# Patient Record
Sex: MALE | Race: WHITE | HISPANIC OR LATINO | Employment: FULL TIME | URBAN - METROPOLITAN AREA
[De-identification: names, ages, dates, MRNs, and addresses within clinical notes are randomized per-mention and may not be internally consistent; named-entity substitution may affect disease eponyms.]

---

## 2024-08-05 ENCOUNTER — APPOINTMENT (OUTPATIENT)
Dept: RADIOLOGY | Facility: MEDICAL CENTER | Age: 29
End: 2024-08-05
Attending: EMERGENCY MEDICINE

## 2024-08-05 ENCOUNTER — HOSPITAL ENCOUNTER (EMERGENCY)
Facility: MEDICAL CENTER | Age: 29
End: 2024-08-05
Attending: EMERGENCY MEDICINE

## 2024-08-05 VITALS
RESPIRATION RATE: 18 BRPM | WEIGHT: 229.5 LBS | HEART RATE: 81 BPM | SYSTOLIC BLOOD PRESSURE: 128 MMHG | OXYGEN SATURATION: 96 % | TEMPERATURE: 98 F | BODY MASS INDEX: 27.1 KG/M2 | HEIGHT: 77 IN | DIASTOLIC BLOOD PRESSURE: 82 MMHG

## 2024-08-05 DIAGNOSIS — J18.9 PNEUMONIA OF BOTH LOWER LOBES DUE TO INFECTIOUS ORGANISM: ICD-10-CM

## 2024-08-05 DIAGNOSIS — R07.81 PLEURITIC CHEST PAIN: ICD-10-CM

## 2024-08-05 LAB
ALBUMIN SERPL BCP-MCNC: 4 G/DL (ref 3.2–4.9)
ALBUMIN/GLOB SERPL: 1.1 G/DL
ALP SERPL-CCNC: 97 U/L (ref 30–99)
ALT SERPL-CCNC: 20 U/L (ref 2–50)
ANION GAP SERPL CALC-SCNC: 12 MMOL/L (ref 7–16)
AST SERPL-CCNC: 19 U/L (ref 12–45)
BASOPHILS # BLD AUTO: 0.7 % (ref 0–1.8)
BASOPHILS # BLD: 0.06 K/UL (ref 0–0.12)
BILIRUB SERPL-MCNC: 0.5 MG/DL (ref 0.1–1.5)
BUN SERPL-MCNC: 8 MG/DL (ref 8–22)
CALCIUM ALBUM COR SERPL-MCNC: 9.6 MG/DL (ref 8.5–10.5)
CALCIUM SERPL-MCNC: 9.6 MG/DL (ref 8.5–10.5)
CHLORIDE SERPL-SCNC: 104 MMOL/L (ref 96–112)
CO2 SERPL-SCNC: 22 MMOL/L (ref 20–33)
CREAT SERPL-MCNC: 0.6 MG/DL (ref 0.5–1.4)
EKG IMPRESSION: NORMAL
EOSINOPHIL # BLD AUTO: 0.2 K/UL (ref 0–0.51)
EOSINOPHIL NFR BLD: 2.4 % (ref 0–6.9)
ERYTHROCYTE [DISTWIDTH] IN BLOOD BY AUTOMATED COUNT: 41.2 FL (ref 35.9–50)
FLUAV RNA SPEC QL NAA+PROBE: NEGATIVE
FLUBV RNA SPEC QL NAA+PROBE: NEGATIVE
GFR SERPLBLD CREATININE-BSD FMLA CKD-EPI: 133 ML/MIN/1.73 M 2
GLOBULIN SER CALC-MCNC: 3.6 G/DL (ref 1.9–3.5)
GLUCOSE SERPL-MCNC: 100 MG/DL (ref 65–99)
HCT VFR BLD AUTO: 53.7 % (ref 42–52)
HGB BLD-MCNC: 18.3 G/DL (ref 14–18)
HIV 1+2 AB+HIV1 P24 AG SERPL QL IA: NORMAL
IMM GRANULOCYTES # BLD AUTO: 0.11 K/UL (ref 0–0.11)
IMM GRANULOCYTES NFR BLD AUTO: 1.3 % (ref 0–0.9)
LIPASE SERPL-CCNC: 22 U/L (ref 11–82)
LYMPHOCYTES # BLD AUTO: 0.86 K/UL (ref 1–4.8)
LYMPHOCYTES NFR BLD: 10.4 % (ref 22–41)
MCH RBC QN AUTO: 28.6 PG (ref 27–33)
MCHC RBC AUTO-ENTMCNC: 34.1 G/DL (ref 32.3–36.5)
MCV RBC AUTO: 83.9 FL (ref 81.4–97.8)
MONOCYTES # BLD AUTO: 0.69 K/UL (ref 0–0.85)
MONOCYTES NFR BLD AUTO: 8.3 % (ref 0–13.4)
NEUTROPHILS # BLD AUTO: 6.38 K/UL (ref 1.82–7.42)
NEUTROPHILS NFR BLD: 76.9 % (ref 44–72)
NRBC # BLD AUTO: 0 K/UL
NRBC BLD-RTO: 0 /100 WBC (ref 0–0.2)
NT-PROBNP SERPL IA-MCNC: <36 PG/ML (ref 0–125)
PLATELET # BLD AUTO: 319 K/UL (ref 164–446)
PMV BLD AUTO: 11.4 FL (ref 9–12.9)
POTASSIUM SERPL-SCNC: 4 MMOL/L (ref 3.6–5.5)
PROT SERPL-MCNC: 7.6 G/DL (ref 6–8.2)
RBC # BLD AUTO: 6.4 M/UL (ref 4.7–6.1)
RSV RNA SPEC QL NAA+PROBE: NEGATIVE
SARS-COV-2 RNA RESP QL NAA+PROBE: NOTDETECTED
SODIUM SERPL-SCNC: 138 MMOL/L (ref 135–145)
TROPONIN T SERPL-MCNC: <6 NG/L (ref 6–19)
WBC # BLD AUTO: 8.3 K/UL (ref 4.8–10.8)

## 2024-08-05 PROCEDURE — 71045 X-RAY EXAM CHEST 1 VIEW: CPT

## 2024-08-05 PROCEDURE — 700102 HCHG RX REV CODE 250 W/ 637 OVERRIDE(OP): Performed by: EMERGENCY MEDICINE

## 2024-08-05 PROCEDURE — 99284 EMERGENCY DEPT VISIT MOD MDM: CPT

## 2024-08-05 PROCEDURE — 93005 ELECTROCARDIOGRAM TRACING: CPT

## 2024-08-05 PROCEDURE — 700117 HCHG RX CONTRAST REV CODE 255: Performed by: EMERGENCY MEDICINE

## 2024-08-05 PROCEDURE — A9270 NON-COVERED ITEM OR SERVICE: HCPCS | Performed by: EMERGENCY MEDICINE

## 2024-08-05 PROCEDURE — 0241U HCHG SARS-COV-2 COVID-19 NFCT DS RESP RNA 4 TRGT ED POC: CPT

## 2024-08-05 PROCEDURE — 36415 COLL VENOUS BLD VENIPUNCTURE: CPT

## 2024-08-05 PROCEDURE — 84484 ASSAY OF TROPONIN QUANT: CPT

## 2024-08-05 PROCEDURE — 83880 ASSAY OF NATRIURETIC PEPTIDE: CPT

## 2024-08-05 PROCEDURE — 83690 ASSAY OF LIPASE: CPT

## 2024-08-05 PROCEDURE — 80053 COMPREHEN METABOLIC PANEL: CPT

## 2024-08-05 PROCEDURE — 87389 HIV-1 AG W/HIV-1&-2 AB AG IA: CPT

## 2024-08-05 PROCEDURE — 93005 ELECTROCARDIOGRAM TRACING: CPT | Performed by: EMERGENCY MEDICINE

## 2024-08-05 PROCEDURE — 85025 COMPLETE CBC W/AUTO DIFF WBC: CPT

## 2024-08-05 PROCEDURE — 71275 CT ANGIOGRAPHY CHEST: CPT

## 2024-08-05 RX ORDER — AZITHROMYCIN 250 MG/1
TABLET, FILM COATED ORAL
Qty: 6 TABLET | Refills: 0 | Status: ACTIVE | OUTPATIENT
Start: 2024-08-05

## 2024-08-05 RX ADMIN — IOHEXOL 55 ML: 350 INJECTION, SOLUTION INTRAVENOUS at 15:41

## 2024-08-05 RX ADMIN — AMOXICILLIN AND CLAVULANATE POTASSIUM 1 TABLET: 875; 125 TABLET, FILM COATED ORAL at 16:37

## 2024-08-05 RX ADMIN — LIDOCAINE HYDROCHLORIDE 30 ML: 20 SOLUTION OROPHARYNGEAL at 13:14

## 2024-08-05 NOTE — ED PROVIDER NOTES
"ER Provider Note    Scribed for Dr. Pawan Chung D.O. by Brendan Morris. 8/5/2024  12:31 PM    Primary Care Provider: No primary care provider on file.    CHIEF COMPLAINT  Chief Complaint   Patient presents with    Chest Pain     Reports substernal chest tightness accompanied by lightheadedness and dizziness this AM.      HPI/ROS  LIMITATION TO HISTORY   Language: Slovak, Family member translated    OUTSIDE HISTORIAN(S):  Family member translated entirety of history    Joao Martinez is a 29 y.o. male who presents to the Emergency Department for evaluation of chest tightness onset 5 hours ago. The patient reports that he was kneeling down at work, and when he went to stand up he felt a pain and tightness in the center of his chest with associated dizziness, headache, blurred vision, sweating, shortness of breath, and a lightheadedness that made him feel near syncopal. Denies nausea. He notes that the other symptoms have gone away, but he still has a tightness in his chest. Patient denies a history of heart or lung issues, and does not drink daily.    ROS as per HPI.    PAST MEDICAL HISTORY  No past heart or lung issues.    SURGICAL HISTORY  No past surgical history noted.    FAMILY HISTORY  Mother has diabetes    SOCIAL HISTORY   Denies drinking daily. States he has about 3 beers during the weekends.    CURRENT MEDICATIONS  Previous Medications    No current medications noted.     ALLERGIES  Patient has no known allergies.    PHYSICAL EXAM  BP (!) 146/89   Pulse 76   Temp 36.6 °C (97.8 °F) (Temporal)   Resp 16   Ht 1.956 m (6' 5\")   Wt 104 kg (229 lb 8 oz)   SpO2 97%   BMI 27.21 kg/m²     General: No acute distress.  HENT: Normocephalic, Mucus membranes are moist.   Chest: Lungs have even and unlabored respirations, Clear to auscultation.   Cardiovascular: Regular rate and regular rhythm, No peripheral cyanosis.  Abdomen: Non distended. Mild mid epigastric tenderness  Neuro: Awake, Conversive, " Able to relay recent events.  Psychiatric: Calm and cooperative.     INITIAL ASSESSMENT  Patient had an episode of chest pain and shortness of breath that happened when he was kneeling and was associated with orthostatic changes and lightheadedness. On exam, he has some pain that is reproducible with palpation of the epigastric area. Will evaluate for cardiac injury and pancreatitis along with treatment for pain.    ED Observation Status? No; Patient does not meet criteria for ED Observation.     DIAGNOSTIC STUDIES    Labs:   Results for orders placed or performed during the hospital encounter of 08/05/24   CBC WITH DIFFERENTIAL   Result Value Ref Range    WBC 8.3 4.8 - 10.8 K/uL    RBC 6.40 (H) 4.70 - 6.10 M/uL    Hemoglobin 18.3 (H) 14.0 - 18.0 g/dL    Hematocrit 53.7 (H) 42.0 - 52.0 %    MCV 83.9 81.4 - 97.8 fL    MCH 28.6 27.0 - 33.0 pg    MCHC 34.1 32.3 - 36.5 g/dL    RDW 41.2 35.9 - 50.0 fL    Platelet Count 319 164 - 446 K/uL    MPV 11.4 9.0 - 12.9 fL    Neutrophils-Polys 76.90 (H) 44.00 - 72.00 %    Lymphocytes 10.40 (L) 22.00 - 41.00 %    Monocytes 8.30 0.00 - 13.40 %    Eosinophils 2.40 0.00 - 6.90 %    Basophils 0.70 0.00 - 1.80 %    Immature Granulocytes 1.30 (H) 0.00 - 0.90 %    Nucleated RBC 0.00 0.00 - 0.20 /100 WBC    Neutrophils (Absolute) 6.38 1.82 - 7.42 K/uL    Lymphs (Absolute) 0.86 (L) 1.00 - 4.80 K/uL    Monos (Absolute) 0.69 0.00 - 0.85 K/uL    Eos (Absolute) 0.20 0.00 - 0.51 K/uL    Baso (Absolute) 0.06 0.00 - 0.12 K/uL    Immature Granulocytes (abs) 0.11 0.00 - 0.11 K/uL    NRBC (Absolute) 0.00 K/uL   COMP METABOLIC PANEL   Result Value Ref Range    Sodium 138 135 - 145 mmol/L    Potassium 4.0 3.6 - 5.5 mmol/L    Chloride 104 96 - 112 mmol/L    Co2 22 20 - 33 mmol/L    Anion Gap 12.0 7.0 - 16.0    Glucose 100 (H) 65 - 99 mg/dL    Bun 8 8 - 22 mg/dL    Creatinine 0.60 0.50 - 1.40 mg/dL    Calcium 9.6 8.5 - 10.5 mg/dL    Correct Calcium 9.6 8.5 - 10.5 mg/dL    AST(SGOT) 19 12 - 45 U/L     ALT(SGPT) 20 2 - 50 U/L    Alkaline Phosphatase 97 30 - 99 U/L    Total Bilirubin 0.5 0.1 - 1.5 mg/dL    Albumin 4.0 3.2 - 4.9 g/dL    Total Protein 7.6 6.0 - 8.2 g/dL    Globulin 3.6 (H) 1.9 - 3.5 g/dL    A-G Ratio 1.1 g/dL   TROPONIN   Result Value Ref Range    Troponin T <6 6 - 19 ng/L   proBrain Natriuretic Peptide, NT   Result Value Ref Range    NT-proBNP <36 0 - 125 pg/mL   LIPASE   Result Value Ref Range    Lipase 22 11 - 82 U/L   ESTIMATED GFR   Result Value Ref Range    GFR (CKD-EPI) 133 >60 mL/min/1.73 m 2   EKG   Result Value Ref Range    Report       [facility] Emergency Dept.    Test Date:  2024  Pt Name:    TIMMY JORGENSEN         Department: ER  MRN:        [id number]                      Room:  Gender:     Male                         Technician: [id number]  :        1995                   Requested By:ER TRIAGE PROTOCOL  Order #:    [id number]                    Reading MD: MAYRA HALL D.O.    Measurements  Intervals                                Axis  Rate:       73                           P:          17  MD:         150                          QRS:        223  QRSD:       108                          T:          49  QT:         383  QTc:        422    Interpretive Statements  Sinus rhythm  Markedly posterior QRS axis  No previous ECG available for comparison  Electronically Signed On 2024 16:32:24 PDT by MAYRA HALL D.O.     POC CoV-2, FLU A/B, RSV by PCR   Result Value Ref Range    POC Influenza A RNA, PCR Negative Negative    POC Influenza B RNA, PCR Negative Negative    POC RSV, by PCR Negative Negative    POC SARS-CoV-2, PCR NotDetected NotDetected     EKG:   I have independently interpreted the above EKG.    Radiology:   The attending emergency physician has independently interpreted the diagnostic imaging associated with this visit and am waiting the final reading from the radiologist.   Preliminary interpretation is as follows: Chest  x-ray interstitial disease versus pneumonia  Radiologist interpretation:   CT-CTA CHEST PULMONARY ARTERY W/ RECONS   Final Result      Extensive bilateral predominantly perihilar groundglass opacities with bronchial wall thickening and extensive mediastinal and hilar adenopathy. Differential considerations would include infectious etiology however neoplastic etiology such as mucosa    associated lymphoid tissue lymphoma (MALT) or other neoplastic etiology cannot be excluded.            DX-CHEST-PORTABLE (1 VIEW)   Final Result      Moderate perihilar interstitial pulmonary edema.        COURSE & MEDICAL DECISION MAKING     COURSE AND PLAN  12:31 PM - Patient seen and examined at bedside. Discussed plan of care, including obtaining lab work and imaging for further evaluation, along with treating his pain with medication. Patient agrees to the plan of care. The patient will be medicated with GI Cocktail 30 mL. Ordered for DX-Chest-Portable, EKG, CBC w/ diff, CMP, and Troponin to evaluate his symptoms.     1:08 PM - Ordered for CT-CTA Chest Pulmonary Artery w/ recons, Estimated GFR, proBrain Natriuretic Peptide, and HIV AG/AB Combo Assay Screening.     4:14 PM - Paged Dr. Tran (Pulmonology).    4:43 PM - I discussed the patient's case and the above findings with Dr. Tran (Pulmonology) who recommends antibiotics treatment and follow-up with pulmonology in 2 weeks    5:01 PM - I reevaluated the patient at bedside. I discussed the patient's diagnostic study results which show likely pneumonia of both lower lobes. I discussed plan for discharge and follow up as outlined below. The patient is stable for discharge at this time and will return for any new or worsening symptoms. Patient verbalizes understanding and support with my plan for discharge.     ED Summary: This patient presented with an episode of chest pain and shortness of breath with lightheadedness.  On his chest x-ray was read as pulmonary edema, this is  unusual for someone his age.  His BNP is normal, COVID is normal I do not know the cause for this abnormal chest x-ray so CT was done CT shows infectious process versus carcinomatous I spoke with the pulmonologist on-call and the patient be treated with antibiotics discharged home with follow-up in 2 weeks with pulmonology      DISPOSITION AND DISCUSSIONS  I have discussed management of the patient with the following physicians and [facility]'s: Dr. Tran (Pulmonology)    Discussion of management with other [facility] or appropriate source(s): None    Barriers to care at this time, including but not limited to: Patient does not have a PCP.     The patient will return for new or worsening symptoms and is stable at the time of discharge.    The patient is referred to a primary physician for blood pressure management, diabetic screening, and for all other preventative health concerns.    DISPOSITION:  Patient will be discharged home in stable condition.    FOLLOW UP:  [facility] - PULMONARY MEDICINE  [address]  [facility] [address]  [phone]  Follow up in 2 week(s)      OUTPATIENT MEDICATIONS:  Discharge Medication List as of 8/5/2024  4:41 PM        START taking these medications    Details   amoxicillin-clavulanate (AUGMENTIN) 875-125 MG Tab Take 1 Tablet by mouth 2 times a day., Disp-20 Tablet, R-0, Normal      azithromycin (ZITHROMAX) 250 MG Tab Take two tabs by mouth on day one, then one tab by mouth daily on days 2-5., Disp-6 Tablet, R-0, Normal           FINAL DIAGNOSIS  1. Pneumonia of both lower lobes due to infectious organism    2. Pleuritic chest pain      IBrendan (Jagruti), am scribing for, and in the presence of, Dr. Pawan Chung D.O.    Electronically signed by: Brendan Morris (Jagruti), 8/5/2024    IDr. Pawan D.O. personally performed the services described in this documentation, as scribed by Brendan Morris in my presence, and it is both accurate and complete.     The  note accurately reflects work and decisions made by me.  Pawan Chung D.O.  8/5/2024  5:16 PM

## 2024-08-05 NOTE — DISCHARGE INSTRUCTIONS
X-ray and CT scan are concerning for pneumonia, this may be a viral pneumonia but bacterial is possible.  You are being placed on 2 antibiotics for this.  Follow-up with a pulmonologist referral has been placed.  Please call the number provided above to make a follow-up appointment.

## 2024-08-05 NOTE — ED TRIAGE NOTES
Joaoalbert Bryantles  29 y.o. male  Chief Complaint   Patient presents with    Chest Pain     Reports substernal chest tightness accompanied by lightheadedness and dizziness this AM.        Pt amb to triage with steady gait for above complaint.   Pt is alert and oriented, speaking in full sentences, follows commands and responds appropriately to questions. Not in any apparent distress. Respirations are even and unlabored.  Pt placed in ED Lobby. Pt educated on triage process. Pt encouraged to alert staff for any changes.

## 2024-08-06 NOTE — ED NOTES
Pt d/c from ED a/o x 4 GCS 15. Pt d/c with help from interpretor ID number [id number], pt given d/c instructions and verbalized understanding of d/c instructions. Pt d/c from ED ambulatory without assistance with steady gait. PIV removed with catheter intact bleeding controlled gauze and tape applied pt tolerated well.

## 2025-02-10 ENCOUNTER — TELEPHONE (OUTPATIENT)
Dept: HEALTH INFORMATION MANAGEMENT | Facility: OTHER | Age: 30
End: 2025-02-10